# Patient Record
Sex: FEMALE | Race: WHITE
[De-identification: names, ages, dates, MRNs, and addresses within clinical notes are randomized per-mention and may not be internally consistent; named-entity substitution may affect disease eponyms.]

---

## 2021-10-19 PROBLEM — Z00.00 ENCOUNTER FOR PREVENTIVE HEALTH EXAMINATION: Status: ACTIVE | Noted: 2021-10-19

## 2021-10-28 ENCOUNTER — APPOINTMENT (OUTPATIENT)
Dept: VASCULAR SURGERY | Facility: CLINIC | Age: 56
End: 2021-10-28
Payer: MEDICARE

## 2021-10-28 VITALS
DIASTOLIC BLOOD PRESSURE: 65 MMHG | BODY MASS INDEX: 36.8 KG/M2 | SYSTOLIC BLOOD PRESSURE: 109 MMHG | HEART RATE: 66 BPM | WEIGHT: 200 LBS | HEIGHT: 62 IN

## 2021-10-28 DIAGNOSIS — I83.90 ASYMPTOMATIC VARICOSE VEINS OF UNSPECIFIED LOWER EXTREMITY: ICD-10-CM

## 2021-10-28 PROCEDURE — 99203 OFFICE O/P NEW LOW 30 MIN: CPT

## 2021-10-28 PROCEDURE — 93970 EXTREMITY STUDY: CPT

## 2021-11-01 PROBLEM — I83.90 VARICOSE VEINS: Status: ACTIVE | Noted: 2021-10-28

## 2021-11-01 RX ORDER — DULOXETINE HYDROCHLORIDE 60 MG/1
60 CAPSULE, DELAYED RELEASE ORAL
Refills: 0 | Status: ACTIVE | COMMUNITY

## 2021-11-01 RX ORDER — GLATIRAMER ACETATE 40 MG/ML
40 INJECTION, SOLUTION SUBCUTANEOUS
Refills: 0 | Status: ACTIVE | COMMUNITY

## 2021-11-01 NOTE — PHYSICAL EXAM
[Respiratory Effort] : normal respiratory effort [Normal Heart Sounds] : normal heart sounds [2+] : left 2+ [de-identified] : well appearing

## 2021-11-01 NOTE — ASSESSMENT
[FreeTextEntry1] : 56 year old female with PMH of cholecystectomy presenting to the office for evaluation of her spider veins. She states they are very painful mainly in the upper thighs. 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 She does not wear compression stockings. \par \par Plan:\par - Explained to her that she does not have venous reflux but she does have some superficial spider veins on the thighs. They are not dangerous and should not cause her any issues in the future, for cosmetic purposes only she can have them injected. She will think about it. For the pain recommend compression stockigns, rx given to patient. \par - FU here as needed.

## 2021-11-01 NOTE — HISTORY OF PRESENT ILLNESS
[FreeTextEntry1] : 56 year old female with PMH of cholecystectomy presenting to the office for evaluation of her spider veins. She states they are very painful mainly in the upper thighs. 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 She does not wear compression stockings.

## 2021-11-01 NOTE — END OF VISIT
[FreeTextEntry3] : I, Dr. Alexus Seth  have read and attest that all the information, medical decision making and discharge instructions within are true and accurate, I  personally performed the evaluation and management (E/M) services for this new patient.  That E/M includes conducting the initial examination, assessing all conditions, and establishing the plan of care.  Today, my ACP, Ilana Manuel PA-C, was here to observe my evaluation and management services for this patient to be followed going forward.\par